# Patient Record
Sex: FEMALE | Race: WHITE | URBAN - METROPOLITAN AREA
[De-identification: names, ages, dates, MRNs, and addresses within clinical notes are randomized per-mention and may not be internally consistent; named-entity substitution may affect disease eponyms.]

---

## 2018-07-19 ENCOUNTER — OUTPATIENT (OUTPATIENT)
Dept: OUTPATIENT SERVICES | Facility: HOSPITAL | Age: 33
LOS: 1 days | Discharge: HOME | End: 2018-07-19

## 2018-07-19 DIAGNOSIS — F41.9 ANXIETY DISORDER, UNSPECIFIED: ICD-10-CM

## 2018-07-19 DIAGNOSIS — R53.83 OTHER FATIGUE: ICD-10-CM

## 2018-07-19 DIAGNOSIS — R79.9 ABNORMAL FINDING OF BLOOD CHEMISTRY, UNSPECIFIED: ICD-10-CM

## 2018-07-19 DIAGNOSIS — M79.606 PAIN IN LEG, UNSPECIFIED: ICD-10-CM

## 2019-08-28 ENCOUNTER — EMERGENCY (EMERGENCY)
Facility: HOSPITAL | Age: 34
LOS: 0 days | Discharge: HOME | End: 2019-08-29
Attending: EMERGENCY MEDICINE | Admitting: EMERGENCY MEDICINE
Payer: MEDICAID

## 2019-08-28 VITALS
WEIGHT: 179.9 LBS | RESPIRATION RATE: 18 BRPM | TEMPERATURE: 96 F | OXYGEN SATURATION: 100 % | DIASTOLIC BLOOD PRESSURE: 66 MMHG | SYSTOLIC BLOOD PRESSURE: 115 MMHG | HEART RATE: 98 BPM

## 2019-08-28 DIAGNOSIS — R20.0 ANESTHESIA OF SKIN: ICD-10-CM

## 2019-08-28 DIAGNOSIS — R51 HEADACHE: ICD-10-CM

## 2019-08-28 DIAGNOSIS — K21.9 GASTRO-ESOPHAGEAL REFLUX DISEASE WITHOUT ESOPHAGITIS: ICD-10-CM

## 2019-08-28 DIAGNOSIS — M54.5 LOW BACK PAIN: ICD-10-CM

## 2019-08-28 LAB
ALBUMIN SERPL ELPH-MCNC: 4.8 G/DL — SIGNIFICANT CHANGE UP (ref 3.5–5.2)
ALP SERPL-CCNC: 117 U/L — HIGH (ref 30–115)
ALT FLD-CCNC: 21 U/L — SIGNIFICANT CHANGE UP (ref 0–41)
ANION GAP SERPL CALC-SCNC: 16 MMOL/L — HIGH (ref 7–14)
AST SERPL-CCNC: 24 U/L — SIGNIFICANT CHANGE UP (ref 0–41)
BASOPHILS # BLD AUTO: 0.04 K/UL — SIGNIFICANT CHANGE UP (ref 0–0.2)
BASOPHILS NFR BLD AUTO: 0.4 % — SIGNIFICANT CHANGE UP (ref 0–1)
BILIRUB SERPL-MCNC: <0.2 MG/DL — SIGNIFICANT CHANGE UP (ref 0.2–1.2)
BUN SERPL-MCNC: 10 MG/DL — SIGNIFICANT CHANGE UP (ref 10–20)
CALCIUM SERPL-MCNC: 10.2 MG/DL — HIGH (ref 8.5–10.1)
CHLORIDE SERPL-SCNC: 98 MMOL/L — SIGNIFICANT CHANGE UP (ref 98–110)
CO2 SERPL-SCNC: 26 MMOL/L — SIGNIFICANT CHANGE UP (ref 17–32)
CREAT SERPL-MCNC: 0.8 MG/DL — SIGNIFICANT CHANGE UP (ref 0.7–1.5)
EOSINOPHIL # BLD AUTO: 0.13 K/UL — SIGNIFICANT CHANGE UP (ref 0–0.7)
EOSINOPHIL NFR BLD AUTO: 1.4 % — SIGNIFICANT CHANGE UP (ref 0–8)
ERYTHROCYTE [SEDIMENTATION RATE] IN BLOOD: 52 MM/HR — HIGH (ref 0–20)
GLUCOSE SERPL-MCNC: 99 MG/DL — SIGNIFICANT CHANGE UP (ref 70–99)
HCT VFR BLD CALC: 35.4 % — LOW (ref 37–47)
HGB BLD-MCNC: 10.8 G/DL — LOW (ref 12–16)
IMM GRANULOCYTES NFR BLD AUTO: 0.3 % — SIGNIFICANT CHANGE UP (ref 0.1–0.3)
LACTATE SERPL-SCNC: 1.2 MMOL/L — SIGNIFICANT CHANGE UP (ref 0.5–2.2)
LYMPHOCYTES # BLD AUTO: 2.9 K/UL — SIGNIFICANT CHANGE UP (ref 1.2–3.4)
LYMPHOCYTES # BLD AUTO: 32.2 % — SIGNIFICANT CHANGE UP (ref 20.5–51.1)
MAGNESIUM SERPL-MCNC: 2 MG/DL — SIGNIFICANT CHANGE UP (ref 1.8–2.4)
MCHC RBC-ENTMCNC: 24.9 PG — LOW (ref 27–31)
MCHC RBC-ENTMCNC: 30.5 G/DL — LOW (ref 32–37)
MCV RBC AUTO: 81.8 FL — SIGNIFICANT CHANGE UP (ref 81–99)
MONOCYTES # BLD AUTO: 0.59 K/UL — SIGNIFICANT CHANGE UP (ref 0.1–0.6)
MONOCYTES NFR BLD AUTO: 6.6 % — SIGNIFICANT CHANGE UP (ref 1.7–9.3)
NEUTROPHILS # BLD AUTO: 5.31 K/UL — SIGNIFICANT CHANGE UP (ref 1.4–6.5)
NEUTROPHILS NFR BLD AUTO: 59.1 % — SIGNIFICANT CHANGE UP (ref 42.2–75.2)
NRBC # BLD: 0 /100 WBCS — SIGNIFICANT CHANGE UP (ref 0–0)
PLATELET # BLD AUTO: 431 K/UL — HIGH (ref 130–400)
POTASSIUM SERPL-MCNC: 4.4 MMOL/L — SIGNIFICANT CHANGE UP (ref 3.5–5)
POTASSIUM SERPL-SCNC: 4.4 MMOL/L — SIGNIFICANT CHANGE UP (ref 3.5–5)
PROT SERPL-MCNC: 8.2 G/DL — HIGH (ref 6–8)
RBC # BLD: 4.33 M/UL — SIGNIFICANT CHANGE UP (ref 4.2–5.4)
RBC # FLD: 15.1 % — HIGH (ref 11.5–14.5)
SODIUM SERPL-SCNC: 140 MMOL/L — SIGNIFICANT CHANGE UP (ref 135–146)
WBC # BLD: 9 K/UL — SIGNIFICANT CHANGE UP (ref 4.8–10.8)
WBC # FLD AUTO: 9 K/UL — SIGNIFICANT CHANGE UP (ref 4.8–10.8)

## 2019-08-28 PROCEDURE — 99218: CPT

## 2019-08-28 RX ORDER — SODIUM CHLORIDE 9 MG/ML
1000 INJECTION INTRAMUSCULAR; INTRAVENOUS; SUBCUTANEOUS ONCE
Refills: 0 | Status: COMPLETED | OUTPATIENT
Start: 2019-08-28 | End: 2019-08-28

## 2019-08-28 RX ADMIN — SODIUM CHLORIDE 2000 MILLILITER(S): 9 INJECTION INTRAMUSCULAR; INTRAVENOUS; SUBCUTANEOUS at 22:00

## 2019-08-28 NOTE — ED ADULT TRIAGE NOTE - CHIEF COMPLAINT QUOTE
pt sts " I had a baby on August 5 and I got an epidural and 3 days after the delievery my entire left side feels like pins and needles and it's not going away and about 3 days ago I started having this bad migraines "

## 2019-08-28 NOTE — ED ADULT NURSE NOTE - OBJECTIVE STATEMENT
pt c.o. lower left back numbness and tingling that radiates though the entire left side up to the neck. pt states "I can feel when someone is touching that side but it feels like pins and needles throughtout the left side" pt s/p child birth 8/2019

## 2019-08-28 NOTE — ED PROVIDER NOTE - PROGRESS NOTE DETAILS
Dr. Díaz - differential most likley related to epidural based on current information, unlikely meningitis but offered patient an LP for continued investigation as her case is unclear and she declined the procedure, understands risks and benefits of doing so and has capacity. Shared medical decision making used for creation of plan. Dr. Díaz - plan d/w MRI tech, unable to get case done tonight due to existing workload, understand patient is not emergent but made notes to expedite given urgency. Will recontact to upgrade MRI if new concerning findings are identified.

## 2019-08-28 NOTE — ED PROVIDER NOTE - CLINICAL SUMMARY MEDICAL DECISION MAKING FREE TEXT BOX
33 female here for sensory deficits after recent epidural at an outside facility, described to me by patient as being problematic during the procedure. Unclear etiology of symptoms, no clear alternative explanation.  Unable to contact anyone regarding the procedure and patient has no way to get outpatient care for these new neurologic symptoms after a spinal injection. Will dispo to EDOU for urgent spinal imaging, send labs, and consult Neuro / Neurosurgery. Case d/w MARK Mccurdy as well.

## 2019-08-28 NOTE — ED PROVIDER NOTE - OBJECTIVE STATEMENT
33 female here for evaluation of multiple sensory complaints after recent epidural done 19 for her first delivery,  at [facility] but states several attempts made for the epidural which was only partially effective during her delivery. Admits to limited ambulation after the delivery due to perineal tear as described by patient. States increasing left sciatic pain and numbness progressing to left arm numbness and left headache over her temporal region. No other clear cause, denies musculoskeletal injury, overuse, trauma, social issues. No travel. No history of similar.     Unable to get any additional HPI from her delivery OB. Her PMD is not available, on vacation. Has no outpatient neuro to see.

## 2019-08-29 VITALS
DIASTOLIC BLOOD PRESSURE: 70 MMHG | TEMPERATURE: 98 F | SYSTOLIC BLOOD PRESSURE: 115 MMHG | OXYGEN SATURATION: 98 % | HEART RATE: 82 BPM | RESPIRATION RATE: 18 BRPM

## 2019-08-29 LAB — HCG UR QL: NEGATIVE — SIGNIFICANT CHANGE UP

## 2019-08-29 PROCEDURE — 72141 MRI NECK SPINE W/O DYE: CPT | Mod: 26

## 2019-08-29 PROCEDURE — 99217: CPT

## 2019-08-29 PROCEDURE — 72148 MRI LUMBAR SPINE W/O DYE: CPT | Mod: 26

## 2019-08-29 PROCEDURE — 72146 MRI CHEST SPINE W/O DYE: CPT | Mod: 26

## 2019-08-29 PROCEDURE — 99283 EMERGENCY DEPT VISIT LOW MDM: CPT

## 2019-08-29 RX ORDER — BENZOCAINE 10 %
1 GEL (GRAM) MUCOUS MEMBRANE ONCE
Refills: 0 | Status: COMPLETED | OUTPATIENT
Start: 2019-08-29 | End: 2019-08-29

## 2019-08-29 RX ORDER — ALPRAZOLAM 0.25 MG
0.5 TABLET ORAL ONCE
Refills: 0 | Status: DISCONTINUED | OUTPATIENT
Start: 2019-08-29 | End: 2019-08-29

## 2019-08-29 RX ADMIN — Medication 1 SPRAY(S): at 01:16

## 2019-08-29 RX ADMIN — Medication 0.5 MILLIGRAM(S): at 14:20

## 2019-08-29 NOTE — ED ADULT NURSE REASSESSMENT NOTE - NS ED NURSE REASSESS COMMENT FT1
Patient received from outgoing RN. Patient alert and oriented. VSS. Patient denies pain, however, complaining of discomfort to left gluteal region. Patient awaiting MRI of Cervical Spine, Lumbar Spine, and Thoracic Spine. Safety and comfort measures performed. Will continue to monitor.

## 2019-08-29 NOTE — ED CDU PROVIDER INITIAL DAY NOTE - ATTENDING CONTRIBUTION TO CARE
I personally evaluated the patient. I reviewed the Resident’s or Physician Assistant’s note (as assigned above), and agree with the findings and plan except as documented in my note.    Pt is a [age]y/o female with PMH of gerd, s/p epidural for delivery 8/5/15 at [facility], presents to ED for numbness that started in left temple, left side of neck, left arm and now she feels it in left gluteal area, left leg. + mild left sided headache. Sx's were mild, constant, improved since onset. Pt denies cp, sob, abdominal pain, fever, chills, cough, focal weakness, slurred speech. ED workup was negative, seen by neurology and neurosurgery, plan for obs for MRI's.    Constitutional: Well developed, well nourished. NAD.  Head: Normocephalic, atraumatic.  Eyes: PERRL. EOMI.  ENT: No nasal discharge. Mucous membranes moist.  Neck: Supple. Painless ROM.  Cardiovascular: Normal S1, S2. Regular rate and rhythm. No murmurs, rubs, or gallops.  Pulmonary: Normal respiratory rate and effort. Lungs clear to auscultation bilaterally. No wheezing, rales, or rhonchi.  Abdominal: Soft. Nondistended. Nontender. No rebound, guarding, rigidity.  Extremities. Pelvis stable. No lower extremity edema, symmetric calves.  Skin: No rashes, cyanosis.  Neuro: CN II-XII grossly intact, no facial asymmetry, no slurred speech. Strength 5/5 in upper and lower extremities. Sensation intact in all extremities. FNF testing normal. No pronator drift. Negative Rhombergs test. Normal gait.   Psych: Normal mood. Normal affect.

## 2019-08-29 NOTE — ED CDU PROVIDER INITIAL DAY NOTE - MEDICAL DECISION MAKING DETAILS
Pt is a [age]y/o female with PMH of gerd, s/p epidural for delivery 8/5/15 at [facility], presents to ED for numbness that started in left temple, left side of neck, left arm and now she feels it in left gluteal area, left leg. + mild left sided headache. Sx's were mild, constant, improved since onset. Pt denies cp, sob, abdominal pain, fever, chills, cough, focal weakness, slurred speech. ED workup was negative, seen by neurology and neurosurgery, plan for obs for MRI's.

## 2019-08-29 NOTE — ED ADULT NURSE REASSESSMENT NOTE - NS ED NURSE REASSESS COMMENT FT1
pt denies numbness and tingling on left side. pt verbalizes readiness for dc. no s.s acute distress.

## 2019-08-29 NOTE — ED CDU PROVIDER DISPOSITION NOTE - CARE PROVIDER_API CALL
Solo Pereyra)  Neurology  [address] [place] [address], [address]  [place] [address]  Phone: [phone]  Fax: [phone]  Follow Up Time:     Nathaniel Murdock)  EEGEpilepsy; Neurology  [address] [place] [address], [address]  [place] [address]  Phone: [phone]  Fax: [phone]  Follow Up Time:

## 2019-08-29 NOTE — ED CDU PROVIDER INITIAL DAY NOTE - OBJECTIVE STATEMENT
34y/o female with pmh of gerd, pt. states on 8/5/15 she received an epidural for the delivery of her first child and since then she has been having numbness that has been progressing. it started in left temple, left side of neck, left arm and now she feels it in left gluteal area, left leg. + mild left sided headache. denies cp, sob, abdominal pain, fever, chills, cough, focal weakness, slurred speech.

## 2019-08-29 NOTE — ED CDU PROVIDER DISPOSITION NOTE - CLINICAL COURSE
MRI results discussed with Neuro clear for dc and neurology follow up. plan discussed with DR. Fan will dispo home

## 2019-08-29 NOTE — CONSULT NOTE ADULT - SUBJECTIVE AND OBJECTIVE BOX
Neurology Consult    Patient is a 33y old  Female who presents with a chief complaint of spinal radiculopathy    HPI:Pt is a 34y/o F presenting with ~1mo history of radicular pain. Pt had  at [facility] ; pt states that she had epidural anesthesia, but several attempts were made by anesthesia at the time, and even then was not fully effective. Pt had large perineal tear and as such had pain after the procedure when she went home. She states that she did feel a shooting pain down her left leg whenever she went to bend over, but attributed it to childbirth. As of two days ago pt states she started experiencing constant pain/tingling in her left shoulder radiating down anterior arm and forearm to her 4th and 5th fingers. Yesterday, at 2AM, pt experienced neck pain that radiated to the left side of her head as a severe migraine that self resolved 4 hours later. She presents today with the constant left arm radicular pain and shooting pain from her back, down back of L leg, to toes, upon bending down.     Interval Hx: Pt denies weakness or sensory lost but instead c/o left side cervical radiculopathy that extends to the middle and pinky finger with associated left frontal temporal migrainous pain and associated left sciatic radiculopathy thats excaerbated when she flexes her hip. The pain startes from mid gluteal muscle and extends to her pinky toe.       PAST MEDICAL & SURGICAL HISTORY:  No pertinent past medical history      FAMILY HISTORY:      Social History: (-) x 3    Allergies    No Known Allergies    Intolerances        MEDICATIONS  (STANDING):    MEDICATIONS  (PRN):      Review of systems:    Constitutional: No fever, weight loss or fatigue    Eyes: No eye pain or discharge  ENMT:  No difficulty hearing; No sinus or throat pain  Neck: No pain or stiffness  Respiratory: No cough, wheezing, chills or hemoptysis  Cardiovascular: No chest pain, palpitations, shortness of breath, dyspnea on exertion  Gastrointestinal: No abdominal pain, nausea, vomiting or hematemesis; No diarrhea or constipation.   Genitourinary: No dysuria, frequency, hematuria or incontinence  Neurological: As per HPI  Skin: No rashes or lesions   Endocrine: No heat or cold intolerance; No hair loss  Musculoskeletal: No joint pain or swelling  Psychiatric: No depression, anxiety, mood swings  Heme/Lymph: No easy bruising or bleeding gums    Vital Signs Last 24 Hrs  T(C): 35.8 (28 Aug 2019 19:33), Max: 35.8 (28 Aug 2019 19:33)  T(F): 96.4 (28 Aug 2019 19:33), Max: 96.4 (28 Aug 2019 19:33)  HR: 96 (29 Aug 2019 00:45) (96 - 98)  BP: 110/68 (29 Aug 2019 00:45) (110/68 - 115/66)  BP(mean): --  RR: 20 (29 Aug 2019 00:45) (18 - 20)  SpO2: 100% (29 Aug 2019 00:45) (100% - 100%)    Neurologic Examination:  General:  Appearance is consistent with chronologic age.  No abnormal facies.   General: The patient is oriented to person, place, time and date.  Recent and remote memory intact.  Fund of knowledge is intact and normal.  Language with normal repetition, comprehension and naming.  Nondysarthric.    Cranial nerves: intact VA, VFF.  EOMI w/o nystagmus, skew or reported double vision.  PERRL.  No ptosis/weakness of eyelid closure.  Facial sensation is normal with normal bite.  No facial asymmetry.  Hearing grossly intact b/l.  Palate elevates midline.  Tongue midline.  Motor examination:   Normal tone, bulk and range of motion.  No tenderness, twitching, tremors or involuntary movements.  Formal Muscle Strength Testing: (MRC grade R/L) 5/5 UE; 5/5 LE.  No observable drift.  Reflexes:   2+ b/l pectoralis, biceps, triceps, brachioradialis, patella and Achilles.  Plantar response downgoing b/l.  Jaw jerk, Shellie, clonus absent.  Sensory examination:   Intact to light touch and pinprick, pain, temperature and proprioception and vibration in all extremities.  Cerebellum:   FTN/HKS intact with normal GRADY in all limbs.  No dysmetria or dysdiadokinesia.  Gait narrow based and normal.    Labs:   CBC Full  -  ( 28 Aug 2019 21:44 )  WBC Count : 9.00 K/uL  RBC Count : 4.33 M/uL  Hemoglobin : 10.8 g/dL  Hematocrit : 35.4 %  Platelet Count - Automated : 431 K/uL  Mean Cell Volume : 81.8 fL  Mean Cell Hemoglobin : 24.9 pg  Mean Cell Hemoglobin Concentration : 30.5 g/dL  Auto Neutrophil # : 5.31 K/uL  Auto Lymphocyte # : 2.90 K/uL  Auto Monocyte # : 0.59 K/uL  Auto Eosinophil # : 0.13 K/uL  Auto Basophil # : 0.04 K/uL  Auto Neutrophil % : 59.1 %  Auto Lymphocyte % : 32.2 %  Auto Monocyte % : 6.6 %  Auto Eosinophil % : 1.4 %  Auto Basophil % : 0.4 %        140  |  98  |  10  ----------------------------<  99  4.4   |  26  |  0.8    Ca    10.2<H>      28 Aug 2019 21:44  Mg     2.0         TPro  8.2<H>  /  Alb  4.8  /  TBili  <0.2  /  DBili  x   /  AST  24  /  ALT  21  /  AlkPhos  117<H>      LIVER FUNCTIONS - ( 28 Aug 2019 21:44 )  Alb: 4.8 g/dL / Pro: 8.2 g/dL / ALK PHOS: 117 U/L / ALT: 21 U/L / AST: 24 U/L / GGT: x                   Neuroimaging:  NCHCT:   CT Angiography/Perfusion:  MRI Brain NC:  MRA Head/Neck:  EEG:    19 @ 03:15

## 2019-08-29 NOTE — ED CDU PROVIDER DISPOSITION NOTE - ATTENDING CONTRIBUTION TO CARE
I personally evaluated the patient. I reviewed the Resident’s or Physician Assistant’s note (as assigned above), and agree with the findings and plan except as documented in my note.    Signed out to me by Dr Albert to f/u MRI results. Mild L5-S1 disc bulging though essentially unremarkable MRIs if C/T/L spine. Patient was reassessed by me, currently complaining of mild lumbar back pain. Denies numbness/tingling/weakness in lower extremities, bowel or bladder incontinence/retention, saddle anesthesia, fever, weight loss, fall/trauma; no h/o IVDA. No gross FND. Recent epidural 8/5/19 at [facility]. Ambulatory. Will discharge with neurology follow up.

## 2019-08-29 NOTE — ED ADULT NURSE REASSESSMENT NOTE - NS ED NURSE REASSESS COMMENT FT1
Patient reassessed, alert and oriented x4. Patient resting comfortably in bed. Patient BP noted to be decreased on assessment. However, patient noted to be asymptomatic. Safety and comfort measures performed. Patient awaiting MRI of Cervical, Lumbar, and Thoracic Spine. Will continue to monitor.

## 2019-08-29 NOTE — ED CDU PROVIDER INITIAL DAY NOTE - NEURO NEGATIVE STATEMENT, MLM
no loss of consciousness, no gait abnormality, no headache,+ numbness to left side of the body, no weakness.

## 2019-08-29 NOTE — CONSULT NOTE ADULT - ASSESSMENT
Assessment:  This is a 33y Female with recent vaginal delivery s/p epidural yolanda present with spinal like radicuopathy    Plan:  Full Spinal Survey MRI   Frequent Neuro checks Assessment:  This is a 33y Female with recent vaginal delivery s/p epidural yolanda present with spinal like radicuopathy    Plan:  Full Spinal Survey MRI   Frequent Neuro checks   Will hold off on decadron until after MRI  Further Recommendations to follow Assessment:  This is a 33y Female with recent vaginal delivery s/p epidural now presents with left sided sciatica and 4th/5th digit intermittent numbness.  May have lumbar radiculopathy but LUE extremity symptoms are probably myofascial in nature.    Plan:    1.  Lumbar spine MRI to exclude L5, S1 nerve root impingement.  2.  Outpatient PT.  3.  Outpatient EMG/NCS left arm and leg if symptoms persist.

## 2019-08-29 NOTE — ED CDU PROVIDER DISPOSITION NOTE - PATIENT PORTAL LINK FT
You can access the FollowMyHealth Patient Portal offered by [facility] by registering at the following website: [email or web address]. By joining [employer]’s FollowMyHealth portal, you will also be able to view your health information using other applications (apps) compatible with our system.

## 2019-08-29 NOTE — ED CDU PROVIDER INITIAL DAY NOTE - PROGRESS NOTE DETAILS
pt seen bedside, NAD, no complaints. pt still feeling some numbness in left hand. Going for MRI spine. will continue to monitor patient.

## 2019-08-29 NOTE — ED CDU PROVIDER DISPOSITION NOTE - CARE PROVIDERS DIRECT ADDRESSES
[email or web address]

## 2019-08-29 NOTE — CONSULT NOTE ADULT - ATTENDING COMMENTS
As above.  At this point there is no imaging available to determine if there is a neurosurgical problem that needs to be addressed.

## 2019-08-29 NOTE — CONSULT NOTE ADULT - SUBJECTIVE AND OBJECTIVE BOX
LESLIE MONTALVO  [id number]      Current Issues: Pt is a 34y/o F presenting with ~1mo history of radicular pain. Pt had  at [facility] ; pt states that she had epidural anesthesia, but several attempts were made by anesthesia at the time, and even then was not fully effective. Pt had large perineal tear and as such had pain after the procedure when she went home. She states that she did feel a shooting pain down her left leg whenever she went to bend over, but attributed it to childbirth. As of two days ago pt states she started experiencing constant pain/tingling in her left shoulder radiating down anterior arm and forearm to her 4th and 5th fingers. Yesterday, at 2AM, pt experienced neck pain that radiated to the left side of her head as a severe migraine that self resolved 4 hours later. She presents today with the constant left arm radicular pain and shooting pain from her back, down back of L leg, to toes, upon bending down.     PAST MEDICAL & SURGICAL HISTORY:  No pertinent past medical history    Allergies  No Known Allergies    Home Medications:    OTHER:  benzocaine 20%/menthol 0.5% Spray 1 Spray(s) Topical once    Vital Signs Last 24 Hrs  T(C): 35.8 (28 Aug 2019 19:33), Max: 35.8 (28 Aug 2019 19:33)  T(F): 96.4 (28 Aug 2019 19:33), Max: 96.4 (28 Aug 2019 19:33)  HR: 98 (28 Aug 2019 19:33) (98 - 98)  BP: 115/66 (28 Aug 2019 19:33) (115/66 - 115/66)  RR: 18 (28 Aug 2019 19:33) (18 - 18)  SpO2: 100% (28 Aug 2019 19:33) (100% - 100%)    PHYSICAL EXAM:  AAOX3. Verbal function intact  Follows commands  tongue midline, facial motions symmetric  PERRL, EOMI  Pronator Drift: none  Finger to Nose intact  Motor: MAEx4, 5/5 power in b/l UE and LE  Sensation: intact to touch in all extremities        LABS:                        10.8   9.00  )-----------( 431      ( 28 Aug 2019 21:44 )             35.4     08-    140  |  98  |  10  ----------------------------<  99  4.4   |  26  |  0.8    Ca    10.2<H>      28 Aug 2019 21:44  Mg     2.0         TPro  8.2<H>  /  Alb  4.8  /  TBili  <0.2  /  DBili  x   /  AST  24  /  ALT  21  /  AlkPhos  117<H>        RADIOLOGY & ADDITIONAL STUDIES:  PENDING      Plan:  - Case discussed with Dr. Honeycutt  - F/u MRI results   - Further recommendations once imaging is done.            .

## 2019-08-30 NOTE — CHART NOTE - NSCHARTNOTEFT_GEN_A_CORE
MRI's reviewed.  No curly disc herniations in cervical, thoracic or lumbar spine.  No neurosurgical intervention at this time.  PT, NSAID, muscle relaxants for now.

## 2020-02-11 PROBLEM — Z78.9 OTHER SPECIFIED HEALTH STATUS: Chronic | Status: ACTIVE | Noted: 2019-08-28

## 2020-04-03 PROBLEM — Z00.00 ENCOUNTER FOR PREVENTIVE HEALTH EXAMINATION: Status: ACTIVE | Noted: 2020-04-03

## 2020-04-06 ENCOUNTER — APPOINTMENT (OUTPATIENT)
Dept: UROGYNECOLOGY | Facility: CLINIC | Age: 35
End: 2020-04-06
Payer: MEDICAID

## 2020-04-06 DIAGNOSIS — Z87.891 PERSONAL HISTORY OF NICOTINE DEPENDENCE: ICD-10-CM

## 2020-04-06 DIAGNOSIS — Z78.9 OTHER SPECIFIED HEALTH STATUS: ICD-10-CM

## 2020-04-06 DIAGNOSIS — Z83.49 FAMILY HISTORY OF OTHER ENDOCRINE, NUTRITIONAL AND METABOLIC DISEASES: ICD-10-CM

## 2020-04-06 DIAGNOSIS — K59.00 CONSTIPATION, UNSPECIFIED: ICD-10-CM

## 2020-04-06 DIAGNOSIS — Z80.41 FAMILY HISTORY OF MALIGNANT NEOPLASM OF OVARY: ICD-10-CM

## 2020-04-06 DIAGNOSIS — Z86.19 PERSONAL HISTORY OF OTHER INFECTIOUS AND PARASITIC DISEASES: ICD-10-CM

## 2020-04-06 PROCEDURE — 99203 OFFICE O/P NEW LOW 30 MIN: CPT | Mod: 95

## 2020-04-06 NOTE — HISTORY OF PRESENT ILLNESS
[Home] : at home, [unfilled] , at the time of the visit. [Other Location: e.g. Home (Enter Location, City,State)___] : at [unfilled] [Patient] : the patient [Self] : self [FreeTextEntry2] : Joie Martins [FreeTextEntry1] : \par Pt with pelvic floor dysfunction here for urogynecologic evaluation. She describes: \par Referring provider: Dr Nichols\par \par Chief PFD: urinary incontinence\par \par Pelvic organ prolapse: no bulge, denies splinting\par Stress urinary incontinence: with coughing, bending, laughing, worse since delivery, most bothered by the leakage with bending and leakage without warning\par Overactive bladder syndrome: voids q15 minutes secondary to incontinence, 2 voids per night, no eneuresis, urge incontinence daily, leakage without warning daily, since vaginal delivery 8 months ago, tried kegels, no glaucoma, not breastfeeding\par Voiding dysfunction: no Incomplete bladder emptying, no hesitancy \par Lower urinary tract/vaginal symptoms: no recurrent UTIs per year, no hematuria, no dysuria, no bladder pain \par Fecal incontinence: denies\par Defecatory dysfunction: sausage\par Sexual dysfunction: no pain, urinary incontinence with orgasm and penetration  \par Pelvic pain: denies\par Vaginal dryness: denies\par \par Her pelvic floor symptoms are significantly bothersome and negatively impacting her quality of life. \par \par

## 2020-04-06 NOTE — DISCUSSION/SUMMARY
[FreeTextEntry1] : \par Mixed incontinence-\par The pathophysiology of the above condition was discussed with the patient. The patient was given information and education on pelvic floor muscle exercises/rehabilitation, avoidance of dietary bladder irritants, and other strategies to improve bladder control, such as scheduled voiding. She was counseled regarding further management strategies for overactive bladder and urge incontinence including pelvic floor physical therapy, medications or surgical management. The patient voiced understanding and agrees with diet changes, constipation control, referral to PT and medical management. The risks and benefits of trospium was reviewed. \par \par The management options for stress incontinence were discussed including observation, pessary placement, pelvic floor physical therapy or surgery. The patient voiced understanding and agrees with a referral to PT and trying the impressa (over the counter) for now.\par \par Constipation-\par Advised starting miralax daily for constipation control.\par \par \par

## 2020-04-06 NOTE — COUNSELING
[FreeTextEntry1] : \par Please increase your water intake to at least 5 cups of water per day\par \par For 4-5 days, cut one item from the list out of your diet.\par \par After 4-5 days, it it makes a difference, you have to decide if it is worth keeping it out of your diet to help with the urinary issues.\par \par If it does not make a difference, you should add it back into your diet and remove another item for another 4-5 days.\par \par Please start taking Miralax (over the counter) for constipation control\par \par Please start taking the trospium (bladder medication) twice a day for bladder control\par \par Please call my office if you have any issues with the cost or side effects of the medication.\par \par Please try the impressa (over the counter) to help with the leakage with bending.\par \par Referral to pelvic floor PT\par \par [facility]\par [place], [place]\par \par \par [facility]\par [address] [place] [id number]\par \par \par Schedule 6 week med check [facility] [place]

## 2020-05-19 RX ORDER — TROSPIUM CHLORIDE 20 MG/1
20 TABLET, FILM COATED ORAL
Qty: 60 | Refills: 1 | Status: DISCONTINUED | COMMUNITY
Start: 2020-04-06 | End: 2020-05-19

## 2020-05-21 ENCOUNTER — APPOINTMENT (OUTPATIENT)
Dept: UROGYNECOLOGY | Facility: CLINIC | Age: 35
End: 2020-05-21

## 2020-07-02 ENCOUNTER — APPOINTMENT (OUTPATIENT)
Dept: UROGYNECOLOGY | Facility: CLINIC | Age: 35
End: 2020-07-02

## 2020-11-28 ENCOUNTER — EMERGENCY (EMERGENCY)
Facility: HOSPITAL | Age: 35
LOS: 0 days | Discharge: HOME | End: 2020-11-28
Attending: EMERGENCY MEDICINE | Admitting: EMERGENCY MEDICINE
Payer: MEDICAID

## 2020-11-28 VITALS
RESPIRATION RATE: 18 BRPM | SYSTOLIC BLOOD PRESSURE: 110 MMHG | DIASTOLIC BLOOD PRESSURE: 74 MMHG | TEMPERATURE: 98 F | HEART RATE: 78 BPM | OXYGEN SATURATION: 100 %

## 2020-11-28 VITALS — WEIGHT: 169.98 LBS

## 2020-11-28 DIAGNOSIS — R07.89 OTHER CHEST PAIN: ICD-10-CM

## 2020-11-28 DIAGNOSIS — M79.602 PAIN IN LEFT ARM: ICD-10-CM

## 2020-11-28 LAB
ALBUMIN SERPL ELPH-MCNC: 4.4 G/DL — SIGNIFICANT CHANGE UP (ref 3.5–5.2)
ALP SERPL-CCNC: 100 U/L — SIGNIFICANT CHANGE UP (ref 30–115)
ALT FLD-CCNC: 23 U/L — SIGNIFICANT CHANGE UP (ref 0–41)
ANION GAP SERPL CALC-SCNC: 9 MMOL/L — SIGNIFICANT CHANGE UP (ref 7–14)
AST SERPL-CCNC: 20 U/L — SIGNIFICANT CHANGE UP (ref 0–41)
BASOPHILS # BLD AUTO: 0.05 K/UL — SIGNIFICANT CHANGE UP (ref 0–0.2)
BASOPHILS NFR BLD AUTO: 0.6 % — SIGNIFICANT CHANGE UP (ref 0–1)
BILIRUB SERPL-MCNC: <0.2 MG/DL — SIGNIFICANT CHANGE UP (ref 0.2–1.2)
BUN SERPL-MCNC: 13 MG/DL — SIGNIFICANT CHANGE UP (ref 10–20)
CALCIUM SERPL-MCNC: 9.3 MG/DL — SIGNIFICANT CHANGE UP (ref 8.5–10.1)
CHLORIDE SERPL-SCNC: 105 MMOL/L — SIGNIFICANT CHANGE UP (ref 98–110)
CO2 SERPL-SCNC: 25 MMOL/L — SIGNIFICANT CHANGE UP (ref 17–32)
CREAT SERPL-MCNC: 0.7 MG/DL — SIGNIFICANT CHANGE UP (ref 0.7–1.5)
EOSINOPHIL # BLD AUTO: 0.08 K/UL — SIGNIFICANT CHANGE UP (ref 0–0.7)
EOSINOPHIL NFR BLD AUTO: 0.9 % — SIGNIFICANT CHANGE UP (ref 0–8)
GLUCOSE SERPL-MCNC: 93 MG/DL — SIGNIFICANT CHANGE UP (ref 70–99)
HCT VFR BLD CALC: 35.8 % — LOW (ref 37–47)
HGB BLD-MCNC: 11.5 G/DL — LOW (ref 12–16)
IMM GRANULOCYTES NFR BLD AUTO: 0.2 % — SIGNIFICANT CHANGE UP (ref 0.1–0.3)
LYMPHOCYTES # BLD AUTO: 1.76 K/UL — SIGNIFICANT CHANGE UP (ref 1.2–3.4)
LYMPHOCYTES # BLD AUTO: 20.1 % — LOW (ref 20.5–51.1)
MCHC RBC-ENTMCNC: 27.4 PG — SIGNIFICANT CHANGE UP (ref 27–31)
MCHC RBC-ENTMCNC: 32.1 G/DL — SIGNIFICANT CHANGE UP (ref 32–37)
MCV RBC AUTO: 85.2 FL — SIGNIFICANT CHANGE UP (ref 81–99)
MONOCYTES # BLD AUTO: 0.5 K/UL — SIGNIFICANT CHANGE UP (ref 0.1–0.6)
MONOCYTES NFR BLD AUTO: 5.7 % — SIGNIFICANT CHANGE UP (ref 1.7–9.3)
NEUTROPHILS # BLD AUTO: 6.34 K/UL — SIGNIFICANT CHANGE UP (ref 1.4–6.5)
NEUTROPHILS NFR BLD AUTO: 72.5 % — SIGNIFICANT CHANGE UP (ref 42.2–75.2)
NRBC # BLD: 0 /100 WBCS — SIGNIFICANT CHANGE UP (ref 0–0)
PLATELET # BLD AUTO: 275 K/UL — SIGNIFICANT CHANGE UP (ref 130–400)
POTASSIUM SERPL-MCNC: 4.5 MMOL/L — SIGNIFICANT CHANGE UP (ref 3.5–5)
POTASSIUM SERPL-SCNC: 4.5 MMOL/L — SIGNIFICANT CHANGE UP (ref 3.5–5)
PROT SERPL-MCNC: 7 G/DL — SIGNIFICANT CHANGE UP (ref 6–8)
RBC # BLD: 4.2 M/UL — SIGNIFICANT CHANGE UP (ref 4.2–5.4)
RBC # FLD: 14.7 % — HIGH (ref 11.5–14.5)
SODIUM SERPL-SCNC: 139 MMOL/L — SIGNIFICANT CHANGE UP (ref 135–146)
TROPONIN T SERPL-MCNC: <0.01 NG/ML — SIGNIFICANT CHANGE UP
WBC # BLD: 8.75 K/UL — SIGNIFICANT CHANGE UP (ref 4.8–10.8)
WBC # FLD AUTO: 8.75 K/UL — SIGNIFICANT CHANGE UP (ref 4.8–10.8)

## 2020-11-28 PROCEDURE — 71046 X-RAY EXAM CHEST 2 VIEWS: CPT | Mod: 26

## 2020-11-28 PROCEDURE — 99285 EMERGENCY DEPT VISIT HI MDM: CPT

## 2020-11-28 NOTE — ED PROVIDER NOTE - PHYSICAL EXAMINATION
Vital Signs: I have reviewed the initial vital signs.  Constitutional: well-nourished, no acute distress  Eyes: PERRLA, EOMI,  clear conjunctiva  Cardiovascular: regular rate, regular rhythm, no murmur appreciated  Respiratory: unlabored respiratory effort, clear to auscultation bilaterally, no chest wall tenderness  Gastrointestinal: soft, non-tender, non-distended  abdomen,  Musculoskeletal: supple neck, no cervical tenderness, no tenderness to trapezius, no Ac tenderness to left shoulder, no lower extremity edema or calf tenderness, no bony tenderness  Integumentary: warm, dry, no rash  Neurologic: awake, alert, cranial nerves II-XII grossly intact, extremities’ motor and sensory functions grossly intact, no focal deficits

## 2020-11-28 NOTE — ED PROVIDER NOTE - OBJECTIVE STATEMENT
36 y/o female non smoker presents to the ED with left arm pain shooting to left 4,5th digits. patient denies any sob. patient states pain radiates to left chest. not worse with movement or deep inspiration. patient states symptoms started at 2000 last night. patient denies any fever,chills. no abdominal pain. no hx of heavy lifting or falls. no palpitations, diaphoresis. no rashes

## 2020-11-28 NOTE — ED ADULT NURSE NOTE - CINV DISCH TEACH PARTICIP
How Severe Are Your Spot(S)?: moderate
Have Your Spot(S) Been Treated In The Past?: has not been treated
Hpi Title: Evaluation of Skin Lesions
Patient

## 2020-11-28 NOTE — ED PROVIDER NOTE - CLINICAL SUMMARY MEDICAL DECISION MAKING FREE TEXT BOX
Patient presents with chest pain. labs, ekg, cxr done. no acute findings. Heart score of zero. Discharged with cardiology follow up and return precautions discussed.

## 2020-11-28 NOTE — ED PROVIDER NOTE - ATTENDING CONTRIBUTION TO CARE
35 y F no pmh presents with left arm pain. States that last night she started to have some left arm and left sided chest pain, sharp, non radiating, constant, 5/10. no trouble breathing, no shortness of breath, no palpitations. no similar episodes in the past. no leg swelling or pain. pain not associated with movement. Also reports one episode of epistaxis at home that resolved on its own    CONSTITUTIONAL: Well-developed; well-nourished; in no acute distress.   SKIN: warm, dry  HEAD: Normocephalic; atraumatic.  EYES: PERRL, EOMI, no conjunctival erythema  ENT: No nasal discharge; airway clear.  NECK: Supple; non tender.  CARD: S1, S2 normal;  Regular rate and rhythm.   RESP: No wheezes, rales or rhonchi.  ABD: soft non tender, non distended, no rebound or guarding  EXT: Normal ROM.  5/5 strength in all 4 extremities. no pedal edema, no calf tenderness.   LYMPH: No acute cervical adenopathy.  NEURO: Alert, oriented, grossly unremarkable. neurovascularly intact  PSYCH: Cooperative, appropriate.

## 2020-11-28 NOTE — ED ADULT NURSE NOTE - NSSEPSISSUSPECTED_ED_A_ED
"Chief Complaint   Patient presents with     Knee Pain     C/o knealing wrong on right knee yesterday. Today is swollen, painful and hard to walk or bend.       Initial /79  Pulse 80  Temp 97.2  F (36.2  C) (Oral)  Wt 132 lb (59.9 kg)  SpO2 95%  BMI 23.76 kg/m2 Estimated body mass index is 23.76 kg/(m^2) as calculated from the following:    Height as of 7/2/18: 5' 2.5\" (1.588 m).    Weight as of this encounter: 132 lb (59.9 kg)..  BP completed using cuff size: mary Diez CMA    "
No

## 2020-11-28 NOTE — ED PROVIDER NOTE - NS ED ROS FT
Review of Systems    Constitutional: (-) fever/ chills (-)loss of appetite or  weight loss  ENT: (-) epistaxis (-) sore throat (-) ear pain  Cardiovascular: (-) left sided chest pain, (-) syncope (-) palpitations  Respiratory: (-) cough, (-) shortness of breath  Gastrointestinal: (-) vomiting, (-) diarrhea (-) abdominal pain  neck: (-) neck pain or stiffness  Musculoskeletal:  (-) back pain, (-) joint pain   Integumentary: (-) rash, (-) swelling  Neurological: (-) headache, (-) altered mental status

## 2020-11-28 NOTE — ED PROVIDER NOTE - PATIENT PORTAL LINK FT
You can access the FollowMyHealth Patient Portal offered by [facility] by registering at the following website: [email or web address]. By joining [facility]’s FollowMyHealth portal, you will also be able to view your health information using other applications (apps) compatible with our system.

## 2020-11-28 NOTE — ED PROVIDER NOTE - CARE PROVIDER_API CALL
JAIME THOMAS  Cardiovascular Disease  [address] [place] [address]  Phone: [phone]  Fax: [phone]  Follow Up Time:

## 2020-11-28 NOTE — ED ADULT TRIAGE NOTE - CHIEF COMPLAINT QUOTE
"I'm having some tingling that starts in my left hand and travels up my arm and into my left chest." Symptoms began last night.

## 2020-12-29 NOTE — ED PROVIDER NOTE - MDM PATIENT STATEMENT FOR ADDL TREATMENT
PATIENT INFORMATION    Check insurance coverage for Shingrix    -- Fasting labwork has been ordered for you.  General patient information when having a lab test:  · Fasting - No caloric intake (WATER IS OKAY) for a minimum of 8-10 hours before your blood draw:  · Tests that commonly require fasting are:  · Cholesterol (lipid panel)  · Basic chemistry panel  · Comprehensive chemistry panel  · Glucose (blood sugar)   · Medicine - You can take any prescribed or routine medicine during your fast.  · Brushing Teeth - You can brush your teeth even if you are fasting.  · Getting Your Results - Your doctor’s office will notify you of your results within 10 working days.    -- Call for mammogram      Follow-Up  -- Make an appointment with Mikaela Samaniego MD in  6-12 months    -- You have been refered to :Gastroenterology (GI) at the [facility] or the [facility] ([facility]) - Phone # is [phone]. Please call if you have not heard from that department in 3 days.     Additional Educational Resources:  For additional resources regarding your symptoms, diagnosis, or further health information, please visit the Health Resources section on [email or web address] or the Online Health Resources section in [email or web address].      
Patient with one or more new problems requiring additional work-up/treatment.

## 2021-03-08 ENCOUNTER — APPOINTMENT (OUTPATIENT)
Dept: UROGYNECOLOGY | Facility: CLINIC | Age: 36
End: 2021-03-08
Payer: MEDICAID

## 2021-03-08 VITALS
BODY MASS INDEX: 27.84 KG/M2 | WEIGHT: 173.25 LBS | HEIGHT: 66 IN | DIASTOLIC BLOOD PRESSURE: 72 MMHG | SYSTOLIC BLOOD PRESSURE: 111 MMHG | HEART RATE: 79 BPM

## 2021-03-08 PROCEDURE — 51701 INSERT BLADDER CATHETER: CPT

## 2021-03-08 PROCEDURE — 99072 ADDL SUPL MATRL&STAF TM PHE: CPT

## 2021-03-08 PROCEDURE — 99213 OFFICE O/P EST LOW 20 MIN: CPT | Mod: 25

## 2021-03-08 RX ORDER — OXYBUTYNIN CHLORIDE 5 MG/1
5 TABLET ORAL
Qty: 60 | Refills: 1 | Status: DISCONTINUED | COMMUNITY
Start: 2020-05-19 | End: 2021-03-08

## 2021-03-08 NOTE — COUNSELING
[FreeTextEntry1] : If you feel like you have an infection it is important for you to call our office and we will arrange testing of your urine.\par \par We will contact you if the urine results are abnormal.\par \par Please STOP taking Oxybutynin 5 mg.\par \par Please begin taking Vesicare 5 mg. It takes up to 6 weeks to go into full effect. Please  your refill when you complete the 1st bottle.\par \par Please call my office if you have any issues with the cost or side effects of the medication. \par \par Schedule a 6 weeks follow up med check appointment.\par

## 2021-03-08 NOTE — PHYSICAL EXAM
[Chaperone Present] : A chaperone was present in the examining room during all aspects of the physical examination [No Acute Distress] : in no acute distress [Well developed] : well developed [Well Nourished] : ~L well nourished [FreeTextEntry1] : Urethra was prepped in sterile fashion and then a sterile catheter was used by me to drain the bladder.\par void: 325cc\par PVR: 15cc

## 2021-03-08 NOTE — DISCUSSION/SUMMARY
[FreeTextEntry1] : Mixed Incontinence-\par Vesicare 5mg QD\par Precautions reviewed.\par Will return in 6 weeks for follow up or earlier if she has any issues.\par \par

## 2021-03-08 NOTE — HISTORY OF PRESENT ILLNESS
[FreeTextEntry1] : Patient is here for 12 months med check for mixed incontinence.\par Last seen on 4/6/2020 as a new pt via telehealth for urinary incontinence.  \par \par Trospium 20mg: no benefit\par Oxybutynin 5mg BID\par \par Sexually active\par no glaucoma\par \par Today, patient states that Oxybutynin 5mg BID is not helping her at all. She's leaking all the time. With coughing, sneezing, laughing, when picking up her child, when bending down. She's leaking throughout the day, c/o frequency and urgency of urination. Denies side effects. Patient does not feel she has an infection.\par \par Patient would like to try another bladder medication. \par

## 2021-03-09 LAB
APPEARANCE: CLEAR
BILIRUBIN URINE: NEGATIVE
BLOOD URINE: NEGATIVE
COLOR: NORMAL
GLUCOSE QUALITATIVE U: NEGATIVE
KETONES URINE: NEGATIVE
LEUKOCYTE ESTERASE URINE: NEGATIVE
NITRITE URINE: NEGATIVE
PH URINE: 7
PROTEIN URINE: NORMAL
SPECIFIC GRAVITY URINE: 1.01
UROBILINOGEN URINE: NORMAL

## 2021-03-10 ENCOUNTER — NON-APPOINTMENT (OUTPATIENT)
Age: 36
End: 2021-03-10

## 2021-03-10 LAB — BACTERIA UR CULT: NORMAL

## 2021-03-10 RX ORDER — SOLIFENACIN SUCCINATE 5 MG/1
5 TABLET ORAL
Qty: 30 | Refills: 1 | Status: DISCONTINUED | COMMUNITY
Start: 2021-03-08 | End: 2021-03-10

## 2021-04-19 ENCOUNTER — APPOINTMENT (OUTPATIENT)
Dept: UROGYNECOLOGY | Facility: CLINIC | Age: 36
End: 2021-04-19

## 2021-04-20 ENCOUNTER — NON-APPOINTMENT (OUTPATIENT)
Age: 36
End: 2021-04-20

## 2022-05-13 NOTE — ED CDU PROVIDER DISPOSITION NOTE - NS ED MD DISPO DISCHARGE
Britany Kwon is a 72 y.o. male who was seen by synchronous (real-time) audio-video technology on 5/13/2022 for No chief complaint on file. Assessment & Plan:   {A/P PLUS DISPO EOJN:15344}    {time statement optional (Optional):61474}    Subjective:       Prior to Admission medications    Medication Sig Start Date End Date Taking? Authorizing Provider   glucose blood VI test strips (OneTouch Ultra Test) strip TEST BLOOD SUGARS THREE TIMES DAILY DX E11.42 4/25/22   Eron Mayers MD   Insulin Needles, Disposable, (BD Ultra-Fine Short Pen Needle) 31 gauge x 5/16\" ndle USE TO GIVE INSULIN UNDER THE SKIN THREE TIMES DAILY. E11.9 4/11/22   Rosa Maria Friend, EDA   tiZANidine (ZANAFLEX) 2 mg tablet TAKE 1 TABLET BY MOUTH THREE TIMES A DAY AS NEEDED FOR MUSCLE SPASMS 4/7/22   Eron Mayers MD   insulin glargine U-300 conc (Toujeo SoloStar U-300 Insulin) 300 unit/mL (1.5 mL) inpn pen 55 Units by SubCUTAneous route daily. Indications: type 2 diabetes mellitus 3/24/22   Eron Mayers MD   ARIPiprazole (ABILIFY) 2 mg tablet Take 1 Tablet by mouth nightly. 3/17/22   rEon Mayers MD   vortioxetine (Trintellix) 10 mg tablet Take 1 Tablet by mouth daily. 3/17/22   Eron Mayers MD   gabapentin (NEURONTIN) 300 mg capsule TAKE 1 CAPSULE BY MOUTH THREE TIMES A DAY 3/15/22   Eron Mayers MD   insulin lispro (HumaLOG KwikPen Insulin) 100 unit/mL kwikpen 25 Units by SubCUTAneous route Before breakfast, lunch, and dinner. 3/14/22   Eron Mayers MD   clopidogreL (PLAVIX) 75 mg tab Take 75 mg by mouth daily. Provider, Historical   esomeprazole (NexIUM) 40 mg capsule Take 1 Capsule by mouth two (2) times a day. 3/2/22   Ruby Elias MD   flash glucose sensor (FreeStyle Keenan 14 Day Sensor) kit Apply and replace sensor every 14 days. Use to scan at least 3 times daily.  Dx: E11.42, Z79.4 2/17/22   Eron Mayers MD   tamsulosin (FLOMAX) 0.4 mg capsule TAKE 1 CAPSULE BY MOUTH EVERY DAY 1/6/22   Eron Mayers MD acetaminophen (Tylenol Arthritis Pain) 650 mg TbER Take 1 Tablet by mouth every eight (8) hours as needed (back pain). 12/8/21   Froylan Schaffer MD   midodrine (PROAMATINE) 5 mg tablet Take 5 mg by mouth three (3) times daily (with meals). 7/17/21   Eliseo Olson MD   metoprolol succinate (TOPROL-XL) 25 mg XL tablet Take 25 mg by mouth every evening. Provider, Historical   Trelegy Ellipta 100-62.5-25 mcg inhaler TAKE 1 PUFF BY MOUTH EVERY DAY 5/7/21   Provider, Historical   metFORMIN (GLUCOPHAGE) 1,000 mg tablet TAKE 1 TABLET BY MOUTH TWICE A DAY WITH MEALS 6/8/21   Froylan Schaffer MD   atorvastatin (LIPITOR) 80 mg tablet TAKE 1 TABLET BY MOUTH EVERY DAY 3/15/21   Froylan Schaffer MD   aspirin delayed-release 81 mg tablet take 1 tablet by oral route  every day 2/18/20   Provider, Historical   albuterol (PROVENTIL HFA, VENTOLIN HFA, PROAIR HFA) 90 mcg/actuation inhaler Take 2 Puffs by inhalation every six (6) hours as needed for Wheezing. 8/19/20   Froylan Schaffer MD   nitroglycerin (NITROSTAT) 0.4 mg SL tablet DISSOLVE ONE TABLET UNDER TONGUE EVERY FIVE MINUTES AS NEEDED FOR CHEST PAIN. May repeat for 3 doses. Call 911 if Chest pain not relieved.  10/4/17   Rupert Muñoz MD     {History SmartLink choices - disappears if left unselected (Optional):77449}    ROS    Objective:     Patient-Reported Vitals 5/11/2021   Patient-Reported Weight 176lb        [INSTRUCTIONS:  \"[x]\" Indicates a positive item  \"[]\" Indicates a negative item  -- DELETE ALL ITEMS NOT EXAMINED]    Constitutional: [x] Appears well-developed and well-nourished [x] No apparent distress      [] Abnormal -     Mental status: [x] Alert and awake  [x] Oriented to person/place/time [x] Able to follow commands    [] Abnormal -     Eyes:   EOM    [x]  Normal    [] Abnormal -   Sclera  [x]  Normal    [] Abnormal -          Discharge [x]  None visible   [] Abnormal -     HENT: [x] Normocephalic, atraumatic  [] Abnormal -   [x] Mouth/Throat: Mucous membranes are moist    External Ears [x] Normal  [] Abnormal -    Neck: [x] No visualized mass [] Abnormal -     Pulmonary/Chest: [x] Respiratory effort normal   [x] No visualized signs of difficulty breathing or respiratory distress        [] Abnormal -      Musculoskeletal:   [x] Normal gait with no signs of ataxia         [x] Normal range of motion of neck        [] Abnormal -     Neurological:        [x] No Facial Asymmetry (Cranial nerve 7 motor function) (limited exam due to video visit)          [x] No gaze palsy        [] Abnormal -          Skin:        [x] No significant exanthematous lesions or discoloration noted on facial skin         [] Abnormal -            Psychiatric:       [x] Normal Affect [] Abnormal -        [x] No Hallucinations    Other pertinent observable physical exam findings:-        We discussed the expected course, resolution and complications of the diagnosis(es) in detail. Medication risks, benefits, costs, interactions, and alternatives were discussed as indicated. I advised him to contact the office if his condition worsens, changes or fails to improve as anticipated. He expressed understanding with the diagnosis(es) and plan. Jania Marquez, was evaluated through a synchronous (real-time) audio-video encounter. The patient (or guardian if applicable) is aware that this is a billable service, which includes applicable co-pays. Verbal consent to proceed has been obtained. The visit was conducted pursuant to the emergency declaration under the [address] [place] [address], [address] [place] [address] authority and the [employer] Act. Patient identification was verified, and a caregiver was present when appropriate. The patient was located at home in a state where the provider was licensed to provide care.       Sydney Linn NP Home

## 2022-05-19 ENCOUNTER — APPOINTMENT (OUTPATIENT)
Dept: UROGYNECOLOGY | Facility: CLINIC | Age: 37
End: 2022-05-19
Payer: MEDICAID

## 2022-05-19 VITALS
SYSTOLIC BLOOD PRESSURE: 109 MMHG | DIASTOLIC BLOOD PRESSURE: 73 MMHG | WEIGHT: 175 LBS | HEART RATE: 85 BPM | BODY MASS INDEX: 28.12 KG/M2 | HEIGHT: 66 IN

## 2022-05-19 PROCEDURE — 99214 OFFICE O/P EST MOD 30 MIN: CPT

## 2022-05-19 RX ORDER — DARIFENACIN HYDROBROMIDE 7.5 MG/1
7.5 TABLET, EXTENDED RELEASE ORAL
Qty: 30 | Refills: 1 | Status: DISCONTINUED | COMMUNITY
Start: 2021-03-10 | End: 2022-05-19

## 2022-05-19 NOTE — DISCUSSION/SUMMARY
[FreeTextEntry1] : \par Mixed Incontinence-\par Counseled the patient on the treatment options for OAB including trying another medication (Myrbetriq) or trying third line options. Counseled the patient on third line therapies including intravesicular Botox, PTNS and Interstim.  \par \par She is possibly considering starting Myrbetriq but does not want to have to take pills every day long term.\par \par Counseled the patient on treatment options for stress incontinence including observation, pelvic floor physical therapy, pessary, or surgery. Patient is interested in surgical option. \par \par Patient will be scheduled for UDS followed by cysto and surgical counseling. Handouts regarding UDS, cysto, third line therapies and management options for JIMENA were given to the patient. \par \par

## 2022-05-19 NOTE — COUNSELING
[FreeTextEntry1] : \par If you feel like you have an infection it is important for you to call our office and we will arrange testing of your urine.\par \par Schedule bladder function testing (UDS without reduction) with MARK Pryor \par \par Please call the office if you feel like you have an infection because we cannot do the bladder function testing in the setting of an infection.\par \par Please come with a full, not painful bladder.\par \par Please schedule cysto and surgical counseling with Dr. Guerrero (to be scheduled for a date after UDS, 1 hour)\par \par Call with any questions\par

## 2022-05-19 NOTE — HISTORY OF PRESENT ILLNESS
[FreeTextEntry1] : Patient is here for 14 months med check for mixed incontinence.\par Last seen on 3/8/2021 for med check.\par \par Trospium 20mg: no benefit\par Oxybutynin 5mg BID: no benefit\par Darefenacin 7.5 QD\par \par Sexually active\par no glaucoma\par \par Today, patient states she saw no improvement of her symptoms after taking Darifenacin 7.5 mg once a day for 3 months so she stopped taking it. Denies side effects. Patient does not feel she has an infection.\par \par Patient is complaining that she is leaking all the time throughout the day. She leaks without warning and also running and moving and with coughing, sneezing, laughing. She doesn’t want to have to keep taking medications. \par \par She went to pelvic floor physical therapy, states that did not help her at all. Has tried Impressa, did not feel that helped her. \par

## 2022-06-27 ENCOUNTER — APPOINTMENT (OUTPATIENT)
Dept: UROGYNECOLOGY | Facility: CLINIC | Age: 37
End: 2022-06-27
Payer: MEDICAID

## 2022-06-27 ENCOUNTER — RESULT CHARGE (OUTPATIENT)
Age: 37
End: 2022-06-27

## 2022-06-27 VITALS
BODY MASS INDEX: 28.12 KG/M2 | WEIGHT: 175 LBS | HEIGHT: 66 IN | HEART RATE: 102 BPM | SYSTOLIC BLOOD PRESSURE: 110 MMHG | DIASTOLIC BLOOD PRESSURE: 68 MMHG

## 2022-06-27 LAB
BILIRUB UR QL STRIP: NEGATIVE
CLARITY UR: CLEAR
COLLECTION METHOD: NORMAL
GLUCOSE UR-MCNC: NEGATIVE
HCG UR QL: 0.2 EU/DL
HCG UR QL: NEGATIVE
HGB UR QL STRIP.AUTO: NEGATIVE
KETONES UR-MCNC: NEGATIVE
LEUKOCYTE ESTERASE UR QL STRIP: NEGATIVE
NITRITE UR QL STRIP: NEGATIVE
PH UR STRIP: 5.5
PROT UR STRIP-MCNC: NEGATIVE
QUALITY CONTROL: YES
SP GR UR STRIP: 1.01

## 2022-06-27 PROCEDURE — 51784 ANAL/URINARY MUSCLE STUDY: CPT

## 2022-06-27 PROCEDURE — 51728 CYSTOMETROGRAM W/VP: CPT

## 2022-06-27 PROCEDURE — 51741 ELECTRO-UROFLOWMETRY FIRST: CPT

## 2022-06-27 PROCEDURE — 51797 INTRAABDOMINAL PRESSURE TEST: CPT

## 2022-07-14 ENCOUNTER — APPOINTMENT (OUTPATIENT)
Dept: UROGYNECOLOGY | Facility: CLINIC | Age: 37
End: 2022-07-14

## 2022-08-16 ENCOUNTER — EMERGENCY (EMERGENCY)
Facility: HOSPITAL | Age: 37
LOS: 0 days | Discharge: HOME | End: 2022-08-16
Attending: EMERGENCY MEDICINE | Admitting: EMERGENCY MEDICINE

## 2022-08-16 VITALS
RESPIRATION RATE: 18 BRPM | SYSTOLIC BLOOD PRESSURE: 115 MMHG | OXYGEN SATURATION: 97 % | TEMPERATURE: 98 F | WEIGHT: 179.9 LBS | HEART RATE: 82 BPM | DIASTOLIC BLOOD PRESSURE: 58 MMHG

## 2022-08-16 VITALS
OXYGEN SATURATION: 98 % | HEART RATE: 80 BPM | TEMPERATURE: 99 F | SYSTOLIC BLOOD PRESSURE: 108 MMHG | RESPIRATION RATE: 18 BRPM | DIASTOLIC BLOOD PRESSURE: 61 MMHG

## 2022-08-16 DIAGNOSIS — R10.30 LOWER ABDOMINAL PAIN, UNSPECIFIED: ICD-10-CM

## 2022-08-16 DIAGNOSIS — R11.0 NAUSEA: ICD-10-CM

## 2022-08-16 DIAGNOSIS — Z87.891 PERSONAL HISTORY OF NICOTINE DEPENDENCE: ICD-10-CM

## 2022-08-16 DIAGNOSIS — Z91.040 LATEX ALLERGY STATUS: ICD-10-CM

## 2022-08-16 DIAGNOSIS — R61 GENERALIZED HYPERHIDROSIS: ICD-10-CM

## 2022-08-16 DIAGNOSIS — K52.9 NONINFECTIVE GASTROENTERITIS AND COLITIS, UNSPECIFIED: ICD-10-CM

## 2022-08-16 DIAGNOSIS — Z91.048 OTHER NONMEDICINAL SUBSTANCE ALLERGY STATUS: ICD-10-CM

## 2022-08-16 LAB
ALBUMIN SERPL ELPH-MCNC: 4.9 G/DL — SIGNIFICANT CHANGE UP (ref 3.5–5.2)
ALP SERPL-CCNC: 100 U/L — SIGNIFICANT CHANGE UP (ref 30–115)
ALT FLD-CCNC: 26 U/L — SIGNIFICANT CHANGE UP (ref 0–41)
ANION GAP SERPL CALC-SCNC: 11 MMOL/L — SIGNIFICANT CHANGE UP (ref 7–14)
APPEARANCE UR: CLEAR — SIGNIFICANT CHANGE UP
AST SERPL-CCNC: 21 U/L — SIGNIFICANT CHANGE UP (ref 0–41)
BACTERIA # UR AUTO: ABNORMAL
BASOPHILS # BLD AUTO: 0.03 K/UL — SIGNIFICANT CHANGE UP (ref 0–0.2)
BASOPHILS NFR BLD AUTO: 0.3 % — SIGNIFICANT CHANGE UP (ref 0–1)
BILIRUB SERPL-MCNC: 0.3 MG/DL — SIGNIFICANT CHANGE UP (ref 0.2–1.2)
BILIRUB UR-MCNC: NEGATIVE — SIGNIFICANT CHANGE UP
BUN SERPL-MCNC: 14 MG/DL — SIGNIFICANT CHANGE UP (ref 10–20)
CALCIUM SERPL-MCNC: 9.6 MG/DL — SIGNIFICANT CHANGE UP (ref 8.5–10.1)
CHLORIDE SERPL-SCNC: 101 MMOL/L — SIGNIFICANT CHANGE UP (ref 98–110)
CO2 SERPL-SCNC: 26 MMOL/L — SIGNIFICANT CHANGE UP (ref 17–32)
COLOR SPEC: YELLOW — SIGNIFICANT CHANGE UP
CREAT SERPL-MCNC: 0.7 MG/DL — SIGNIFICANT CHANGE UP (ref 0.7–1.5)
DIFF PNL FLD: ABNORMAL
EGFR: 116 ML/MIN/1.73M2 — SIGNIFICANT CHANGE UP
EOSINOPHIL # BLD AUTO: 0.08 K/UL — SIGNIFICANT CHANGE UP (ref 0–0.7)
EOSINOPHIL NFR BLD AUTO: 0.9 % — SIGNIFICANT CHANGE UP (ref 0–8)
EPI CELLS # UR: ABNORMAL /HPF
GLUCOSE SERPL-MCNC: 104 MG/DL — HIGH (ref 70–99)
GLUCOSE UR QL: NEGATIVE MG/DL — SIGNIFICANT CHANGE UP
HCG SERPL QL: NEGATIVE — SIGNIFICANT CHANGE UP
HCT VFR BLD CALC: 38.5 % — SIGNIFICANT CHANGE UP (ref 37–47)
HGB BLD-MCNC: 12.8 G/DL — SIGNIFICANT CHANGE UP (ref 12–16)
IMM GRANULOCYTES NFR BLD AUTO: 0.3 % — SIGNIFICANT CHANGE UP (ref 0.1–0.3)
KETONES UR-MCNC: NEGATIVE — SIGNIFICANT CHANGE UP
LEUKOCYTE ESTERASE UR-ACNC: ABNORMAL
LYMPHOCYTES # BLD AUTO: 1.52 K/UL — SIGNIFICANT CHANGE UP (ref 1.2–3.4)
LYMPHOCYTES # BLD AUTO: 16.6 % — LOW (ref 20.5–51.1)
MCHC RBC-ENTMCNC: 29.4 PG — SIGNIFICANT CHANGE UP (ref 27–31)
MCHC RBC-ENTMCNC: 33.2 G/DL — SIGNIFICANT CHANGE UP (ref 32–37)
MCV RBC AUTO: 88.5 FL — SIGNIFICANT CHANGE UP (ref 81–99)
MONOCYTES # BLD AUTO: 0.63 K/UL — HIGH (ref 0.1–0.6)
MONOCYTES NFR BLD AUTO: 6.9 % — SIGNIFICANT CHANGE UP (ref 1.7–9.3)
NEUTROPHILS # BLD AUTO: 6.85 K/UL — HIGH (ref 1.4–6.5)
NEUTROPHILS NFR BLD AUTO: 75 % — SIGNIFICANT CHANGE UP (ref 42.2–75.2)
NITRITE UR-MCNC: NEGATIVE — SIGNIFICANT CHANGE UP
NRBC # BLD: 0 /100 WBCS — SIGNIFICANT CHANGE UP (ref 0–0)
PH UR: 6.5 — SIGNIFICANT CHANGE UP (ref 5–8)
PLATELET # BLD AUTO: 261 K/UL — SIGNIFICANT CHANGE UP (ref 130–400)
POTASSIUM SERPL-MCNC: 4.3 MMOL/L — SIGNIFICANT CHANGE UP (ref 3.5–5)
POTASSIUM SERPL-SCNC: 4.3 MMOL/L — SIGNIFICANT CHANGE UP (ref 3.5–5)
PROT SERPL-MCNC: 7.4 G/DL — SIGNIFICANT CHANGE UP (ref 6–8)
PROT UR-MCNC: NEGATIVE MG/DL — SIGNIFICANT CHANGE UP
RBC # BLD: 4.35 M/UL — SIGNIFICANT CHANGE UP (ref 4.2–5.4)
RBC # FLD: 13 % — SIGNIFICANT CHANGE UP (ref 11.5–14.5)
RBC CASTS # UR COMP ASSIST: ABNORMAL /HPF
SODIUM SERPL-SCNC: 138 MMOL/L — SIGNIFICANT CHANGE UP (ref 135–146)
SP GR SPEC: 1.01 — SIGNIFICANT CHANGE UP (ref 1.01–1.03)
UROBILINOGEN FLD QL: 0.2 MG/DL — SIGNIFICANT CHANGE UP
WBC # BLD: 9.14 K/UL — SIGNIFICANT CHANGE UP (ref 4.8–10.8)
WBC # FLD AUTO: 9.14 K/UL — SIGNIFICANT CHANGE UP (ref 4.8–10.8)
WBC UR QL: ABNORMAL /HPF

## 2022-08-16 PROCEDURE — 99285 EMERGENCY DEPT VISIT HI MDM: CPT

## 2022-08-16 PROCEDURE — 74176 CT ABD & PELVIS W/O CONTRAST: CPT | Mod: 26,MA

## 2022-08-16 RX ORDER — KETOROLAC TROMETHAMINE 30 MG/ML
30 SYRINGE (ML) INJECTION ONCE
Refills: 0 | Status: DISCONTINUED | OUTPATIENT
Start: 2022-08-16 | End: 2022-08-16

## 2022-08-16 RX ORDER — ONDANSETRON 8 MG/1
4 TABLET, FILM COATED ORAL ONCE
Refills: 0 | Status: COMPLETED | OUTPATIENT
Start: 2022-08-16 | End: 2022-08-16

## 2022-08-16 RX ORDER — KETOROLAC TROMETHAMINE 30 MG/ML
15 SYRINGE (ML) INJECTION ONCE
Refills: 0 | Status: DISCONTINUED | OUTPATIENT
Start: 2022-08-16 | End: 2022-08-16

## 2022-08-16 RX ADMIN — ONDANSETRON 4 MILLIGRAM(S): 8 TABLET, FILM COATED ORAL at 11:54

## 2022-08-16 RX ADMIN — Medication 15 MILLIGRAM(S): at 17:25

## 2022-08-16 RX ADMIN — Medication 15 MILLIGRAM(S): at 11:54

## 2022-08-16 NOTE — ED PROVIDER NOTE - CLINICAL SUMMARY MEDICAL DECISION MAKING FREE TEXT BOX
patient presents for evaluation of abdominal pain we obtained urine labs CT patient given IV fluids IV pain medicine IV Zofran she is improved at this time and will discharge follow-up to her PMD

## 2022-08-16 NOTE — ED PROVIDER NOTE - ATTENDING APP SHARED VISIT CONTRIBUTION OF CARE
I was present for and supervised the key and critical aspects of the procedures performed during the care of the patient. Patient is a 36-year-old female complains of abdominal pain located in the bilateral lower quadrants and suprapubic region that started suddenly this morning notes it is intermittent cramping in nature she denies any dysuria hesitancy or frequency pain was associated with diaphoresis nauseousness no vomiting patient took over-the-counter medication with limited relief though she did have a normal bowel movement this morning she denies any back pain fevers chills she denies any chest pain or shortness of breath    On physical exam patient is normocephalic atraumatic pupils equal round reactive light accommodation extraocular muscles intact oropharynx clear chest clear to auscultation bilaterally abdomen is soft she has mild tenderness to palpation in the suprapubic region no guarding no rebound no CVA tenderness extremities full range of motion with no edema    Assessment plan patient presents for evaluation of abdominal pain we obtained urine labs CT patient given IV fluids IV pain medicine IV Zofran she is improved at this time and will discharge follow-up to her PMD

## 2022-08-16 NOTE — ED PROVIDER NOTE - NS ED ATTENDING STATEMENT MOD
This was a shared visit with the PÉREZ. I reviewed and verified the documentation and independently performed the documented: Attending with

## 2022-08-16 NOTE — ED PROVIDER NOTE - PROGRESS NOTE DETAILS
Curtisgroo: Pt reports improvement in pain and nausea s/p meds but still has lower abdominal cramping sensation.

## 2022-08-16 NOTE — ED PROVIDER NOTE - NS ED ROS FT
Constitutional: No fever  Eyes:  No visual changes, eye pain, or discharge.  ENMT:  No hearing changes, earpain, sore throat, runny nose, or difficulty swallowing  Cardiac:  No chest pain, SOB or edema. No chest pain with exertion.  Respiratory:  No cough or respiratory distress.   GI:  +abd pain, nausea. No vomiting, diarrhea  :  No dysuria, frequency or burning.  MS:  No myalgia, muscle weakness, joint pain or back pain.  Neuro:  No headache or weakness.  No LOC.  Skin:  No skin rash.

## 2022-08-16 NOTE — ED PROVIDER NOTE - PATIENT PORTAL LINK FT
You can access the FollowMyHealth Patient Portal offered by [facility] by registering at the following website: [email or web address]. By joining [facility]’s FollowMyHealth portal, you will also be able to view your health information using other applications (apps) compatible with our system.

## 2022-08-16 NOTE — ED PROVIDER NOTE - OBJECTIVE STATEMENT
35 y/o F with no PMH, no hx abd surgeries presenting for lower abd pain that started mildly this morning while she was passing stool and suddenly worsened at 9am. Pain is intermittent, comes and goes about every 5 minutes, no known triggers, nonradiating. Pt used Pepto Bismol without relief. 37 y/o F with no PMH, no hx abd surgeries presenting for lower abd pain that started mildly this morning while she was passing stool and suddenly worsened at 9am. Pain is intermittent, comes and goes about every 5 minutes, no known triggers, nonradiating. A/w nausea but no vomiting. Pt used Pepto Bismol without relief. LBM this morning, small amount of watery stool. Currently menstruating since 6 days ago. No dysuria, back pain, hematuria, fever, chest pain, SOB.

## 2022-08-16 NOTE — ED PROVIDER NOTE - PHYSICAL EXAMINATION
CONSTITUTIONAL: Well-developed; well-nourished; in no acute distress.   SKIN: Warm, dry  HEAD: Normocephalic; atraumatic  EYES: PERRL, EOMI, normal sclera and conjunctiva   ENT: No nasal discharge; airway clear. MMM  NECK: Supple; non tender.  CARD:  Regular rate and rhythm. Normal S1, S2. 2+ distal pulses.   RESP: No increased WOB. CTA b/l without wheezes, crackles, rhonchi  ABD: Normoactive BS. Soft, nondistended. Mild suprapubic tenderness, no rebound tenderness. No CVA tenderness.  EXT: Normal ROM.   LYMPH: No acute cervical adenopathy.  NEURO: Alert, oriented, grossly unremarkable  PSYCH: Cooperative, appropriate.

## 2022-08-16 NOTE — ED PROVIDER NOTE - NSFOLLOWUPINSTRUCTIONS_ED_ALL_ED_FT
Colitis    WHAT YOU NEED TO KNOW:    Colitis is swelling and irritation of your colon. Colitis may be caused by ulcers or a problem with your immune system. Bacteria, a virus, or a parasite may also cause colitis. The cause may not be known. You may have diarrhea, abdominal pain, fever, or blood or mucus in your bowel movement.    DISCHARGE INSTRUCTIONS:    Return to the emergency department if:   •You have sudden trouble breathing.      •Your bowel movements are black or have blood in them.      •You have blood in your vomit.      •You have severe abdominal pain or your abdomen is swollen and feels hard.      •You have any of the following signs of dehydration: ?Dizziness or weakness      ?Dry mouth, cracked lips, or severe thirst      ?Fast heartbeat or breathing      ?Urinating very little or not at all        Call your doctor if:   •Your symptoms get worse or do not go away.      •You have a fever, chills, cough, or feel weak and achy.      •You suddenly lose weight without trying.      •You have questions or concerns about your condition or care.      Medicines:   •Medicines may be given to decrease inflammation in your colon and treat diarrhea.      •Take your medicine as directed. Contact your healthcare provider if you think your medicine is not helping or if you have side effects. Tell him or her if you are allergic to any medicine. Keep a list of the medicines, vitamins, and herbs you take. Include the amounts, and when and why you take them. Bring the list or the pill bottles to follow-up visits. Carry your medicine list with you in case of an emergency.      Manage your symptoms:   •Drink liquids as directed to help prevent dehydration. Good liquids to drink include water, juice, and broth. Ask how much liquid to drink each day. You may need to drink an oral rehydration solution (ORS). An ORS contains a balance of water, salt, and sugar to replace body fluids lost during diarrhea.      •Eat a variety of healthy foods. Healthy foods include fruits, vegetables, whole-grain breads, beans, low-fat dairy products, lean meats, and fish. You may need to eat several small meals throughout the day instead of large meals. Avoid spicy foods, caffeine, chocolate, and foods high in fat.      •Talk to your healthcare provider before you take NSAIDs. NSAIDs can cause worsen your symptoms if ulcers are causing your colitis.      •Start to exercise when you feel better. Regular exercise helps your bowels work normally. Ask about the best exercise plan for you.      Prevent the spread of germs:          •Wash your hands often. Wash your hands several times each day. Wash after you use the bathroom, change a child's diaper, and before you prepare or eat food. Use soap and water every time. Rub your soapy hands together, lacing your fingers. Wash the front and back of your hands, and in between your fingers. Use the fingers of one hand to scrub under the fingernails of the other hand. Wash for at least 20 seconds. Rinse with warm, running water for several seconds. Then dry your hands with a clean towel or paper towel. Use hand  that contains alcohol if soap and water are not available. Do not touch your eyes, nose, or mouth without washing your hands first.  Handwashing           •Cover a sneeze or cough. Use a tissue that covers your mouth and nose. Throw the tissue away in a trash can right away. Use the bend of your arm if a tissue is not available. Wash your hands well with soap and water or use a hand .      •Clean surfaces often. Clean doorknobs, countertops, cell phones, and other surfaces that are touched often. Use a disinfecting wipe, a single-use sponge, or a cloth you can wash and reuse. Use disinfecting  if you do not have wipes. You can create a disinfecting  by mixing 1 part bleach with 10 parts water.      •Ask about vaccines you may need. Vaccines help prevent disease caused by some viruses and bacteria. Get the influenza (flu) vaccine as soon as recommended each year. The flu vaccine is usually available starting in September or October. Flu viruses change, so it is important to get a flu vaccine every year. Get the pneumonia vaccine if recommended. This vaccine is usually recommended every 5 years. Your provider will tell you when to get this vaccine, if needed. Your healthcare provider can tell you if you should get other vaccines, and when to get them.      Follow up with your doctor as directed: You may need to return for a colonoscopy or other tests. Write down how often you have a bowel movements and what they look like. Bring this to your follow-up visits. Write down your questions so you remember to ask them during your visits.

## 2022-08-25 ENCOUNTER — APPOINTMENT (OUTPATIENT)
Dept: UROGYNECOLOGY | Facility: CLINIC | Age: 37
End: 2022-08-25

## 2022-08-25 ENCOUNTER — NON-APPOINTMENT (OUTPATIENT)
Age: 37
End: 2022-08-25

## 2022-08-25 VITALS
WEIGHT: 180 LBS | BODY MASS INDEX: 28.93 KG/M2 | HEIGHT: 66 IN | HEART RATE: 83 BPM | DIASTOLIC BLOOD PRESSURE: 73 MMHG | SYSTOLIC BLOOD PRESSURE: 106 MMHG

## 2022-08-25 LAB
BILIRUB UR QL STRIP: NORMAL
CLARITY UR: CLEAR
COLLECTION METHOD: NORMAL
GLUCOSE UR-MCNC: NORMAL
HCG UR QL: 0.2 EU/DL
HCG UR QL: NEGATIVE
HGB UR QL STRIP.AUTO: NORMAL
KETONES UR-MCNC: NORMAL
LEUKOCYTE ESTERASE UR QL STRIP: NORMAL
NITRITE UR QL STRIP: NORMAL
PH UR STRIP: 6
PROT UR STRIP-MCNC: NORMAL
SP GR UR STRIP: 1.01

## 2022-08-25 PROCEDURE — 81003 URINALYSIS AUTO W/O SCOPE: CPT | Mod: QW

## 2022-08-25 PROCEDURE — 81025 URINE PREGNANCY TEST: CPT

## 2022-08-25 PROCEDURE — 52000 CYSTOURETHROSCOPY: CPT

## 2022-08-25 PROCEDURE — 99215 OFFICE O/P EST HI 40 MIN: CPT | Mod: 25

## 2022-08-27 LAB — BACTERIA UR CULT: NORMAL

## 2022-12-09 ENCOUNTER — APPOINTMENT (OUTPATIENT)
Dept: UROGYNECOLOGY | Facility: CLINIC | Age: 37
End: 2022-12-09

## 2022-12-09 VITALS
WEIGHT: 175 LBS | DIASTOLIC BLOOD PRESSURE: 74 MMHG | SYSTOLIC BLOOD PRESSURE: 109 MMHG | HEIGHT: 66 IN | HEART RATE: 87 BPM | BODY MASS INDEX: 28.12 KG/M2

## 2022-12-09 DIAGNOSIS — N39.46 MIXED INCONTINENCE: ICD-10-CM

## 2022-12-09 DIAGNOSIS — K52.9 NONINFECTIVE GASTROENTERITIS AND COLITIS, UNSPECIFIED: ICD-10-CM

## 2022-12-09 PROCEDURE — 99215 OFFICE O/P EST HI 40 MIN: CPT

## 2022-12-09 RX ORDER — FLUCONAZOLE 150 MG/1
150 TABLET ORAL
Qty: 2 | Refills: 0 | Status: COMPLETED | COMMUNITY
Start: 2022-08-25 | End: 2022-12-09

## 2022-12-09 NOTE — COUNSELING
[FreeTextEntry1] : \madyson Rivero, my , will contact you to arrange your surgical date, preoperative testing and preoperative medical evaluation.\par \par Call with any issues\par \par Hopefully, I will see you in January!\par \par I will look into nickel

## 2022-12-09 NOTE — DISCUSSION/SUMMARY
[FreeTextEntry1] : \par Mixed incontinence-\par The risks and benefits of retropubic midurethral sling was reviewed. She was advised that this does not improve urge incontinence or leakage without warning and can worsen it.\par \par We discussed that sometimes after the retropubic sling is placed, restarting medications that did not work previously with urge incontinence can start working because the urethral issue has been fixed. But if they don't, the patient would then consider staged botox or interstim.\par \par She really wants to be able to run after and play with her daughter and not have incontinence while running after her.\par \par The surgical procedure of an exam under anesthesia/retropubic midurethral sling/cysto was reviewed. The patient was advised that the surgery does not improve urge incontinence and can worsen those symptoms. The postoperative restrictions were reviewed. All of the patient's questions and concerns were answered. The interpretation of the urodynamics was reviewed. \par \par The patient was counseled that the risk of long term retention requiring transection of sling is around 1%, the risk of bladder perforation with the sling placement is around 6% and the risk of the sling not helping with the stress incontinence is around 10%.\par \par The patient was advised that she is at an increased of vaginal pain postoperatively since she already has pain.\par \par The risks and benefits and alternatives of the above procedures were reviewed and informed consent was signed. The patient will be scheduled for surgery, preop lab testing and preop medical eval.\par \par \par \par \par \par

## 2022-12-09 NOTE — HISTORY OF PRESENT ILLNESS
[FreeTextEntry1] : \par Patient is here for management discussion for mixed incontinence\par Last seen on 8/25/2022 for a cysto and consent signing\par Since then has been having severe colitis symptoms. She was scheduled for a colonoscopy last week but then had the flu. It is rescheduled for next Friday. She knows that if she needs further GI treatment, that this surgery would need to be postponed further\par \par Mixed incontinence, S=U, most bothered by leakage without warning\par Trospium 20mg: no benefit\par Oxybutynin 5mg BID: no benefit\par Darefenacin 7.5 QD: no benefit\par \par She went to pelvic floor physical therapy, states that did not help her at all. Has tried Impressa, did not feel that helped her. \par \par 6/27/2022: urodynamics\par Impression: sensitive bladder (capacity 132cc), +USUI, no obstructive voiding\par Plan: consider third line treatment for urge incontinence, midurethral sling for stress incontinence, staged procedures\par \par Previous smoker\par Not done with childbearing\par

## 2024-06-08 NOTE — ED CDU PROVIDER INITIAL DAY NOTE - NEUROLOGICAL, MLM
Has thoracic aortic aneurysm, 4cm:  Followed by [facility] CT surgery outpatient  Smoking cessation counseled   Continue with asa, statin   Cont outpt follow up with primary team   Alert and oriented, no focal deficits, no motor or sensory deficits.

## 2024-09-29 ENCOUNTER — EMERGENCY (EMERGENCY)
Facility: HOSPITAL | Age: 39
LOS: 0 days | Discharge: HOME | End: 2024-09-29
Attending: EMERGENCY MEDICINE
Payer: MEDICAID

## 2024-09-29 VITALS
RESPIRATION RATE: 20 BRPM | HEART RATE: 130 BPM | OXYGEN SATURATION: 100 % | HEIGHT: 66 IN | SYSTOLIC BLOOD PRESSURE: 148 MMHG | WEIGHT: 179.9 LBS | DIASTOLIC BLOOD PRESSURE: 72 MMHG | TEMPERATURE: 98 F

## 2024-09-29 VITALS — DIASTOLIC BLOOD PRESSURE: 68 MMHG | SYSTOLIC BLOOD PRESSURE: 144 MMHG | HEART RATE: 97 BPM

## 2024-09-29 DIAGNOSIS — Z91.048 OTHER NONMEDICINAL SUBSTANCE ALLERGY STATUS: ICD-10-CM

## 2024-09-29 DIAGNOSIS — T14.8XXA OTHER INJURY OF UNSPECIFIED BODY REGION, INITIAL ENCOUNTER: ICD-10-CM

## 2024-09-29 DIAGNOSIS — Z87.891 PERSONAL HISTORY OF NICOTINE DEPENDENCE: ICD-10-CM

## 2024-09-29 DIAGNOSIS — S10.91XA ABRASION OF UNSPECIFIED PART OF NECK, INITIAL ENCOUNTER: ICD-10-CM

## 2024-09-29 DIAGNOSIS — Z01.89 ENCOUNTER FOR OTHER SPECIFIED SPECIAL EXAMINATIONS: ICD-10-CM

## 2024-09-29 DIAGNOSIS — Z91.040 LATEX ALLERGY STATUS: ICD-10-CM
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
## 2024-09-29 PROCEDURE — 99283 EMERGENCY DEPT VISIT LOW MDM: CPT

## 2024-09-29 PROCEDURE — 99284 EMERGENCY DEPT VISIT MOD MDM: CPT

## 2024-09-29 RX ORDER — HYDROXYZINE HCL 25 MG
25 TABLET ORAL ONCE
Refills: 0 | Status: COMPLETED | OUTPATIENT
Start: 2024-09-29 | End: 2024-09-29

## 2024-09-29 RX ORDER — ONDANSETRON 2 MG/ML
4 INJECTION, SOLUTION INTRAMUSCULAR; INTRAVENOUS ONCE
Refills: 0 | Status: COMPLETED | OUTPATIENT
Start: 2024-09-29 | End: 2024-09-29

## 2024-09-29 RX ADMIN — ONDANSETRON 4 MILLIGRAM(S): 2 INJECTION, SOLUTION INTRAMUSCULAR; INTRAVENOUS at 21:28

## 2024-09-29 RX ADMIN — Medication 25 MILLIGRAM(S): at 21:25
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
## 2024-09-29 NOTE — ED PROVIDER NOTE - PHYSICAL EXAMINATION
VITAL SIGNS: I have reviewed nursing notes and confirm.  CONSTITUTIONAL: Well-developed; well-nourished; in no acute distress.  SKIN: Skin exam is warm and dry, no acute rash.  HEAD: Normocephalic; atraumatic.  EYES: PERRL, EOM intact; conjunctiva and sclera clear.  ENT: No nasal deviation, no epistaxis; airway clear. Throat clear.  NECK: + superficial abrasions anterior neck.  CARD: S1, S2 normal; no murmurs, gallops, or rubs. Regular rate and rhythm.  RESP: No wheezes, rales or rhonchi.  ABD: Normal bowel sounds; soft; non-distended; non-tender; no hepatosplenomegaly.  EXT: Normal ROM. No clubbing, cyanosis or edema.  NEURO: Alert, oriented. Grossly unremarkable. No focal deficits.  PSYCH: anxious but Cooperative, appropriate.

## 2024-09-29 NOTE — ED PROVIDER NOTE - PATIENT PORTAL LINK FT
You can access the FollowMyHealth Patient Portal offered by [facility] by registering at the following website: [email or web address]. By joining [email or web address]’s FollowMyHealth portal, you will also be able to view your health information using other applications (apps) compatible with our system.
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
## 2024-09-29 NOTE — ED PROVIDER NOTE - NSFOLLOWUPINSTRUCTIONS_ED_ALL_ED_FT
Contusion    A contusion is a deep bruise. Contusions are the result of a blunt injury to tissues and muscle fibers under the skin. The injury causes bleeding under the skin. The skin overlying the contusion may turn blue, purple, or yellow. Minor injuries will give you a painless contusion, but more severe contusions may stay painful and swollen for a few weeks.     CAUSES  This condition is usually caused by a blow, trauma, or direct force to an area of the body.    SYMPTOMS  Symptoms of this condition include:    Swelling of the injured area.  Pain and tenderness in the injured area.  Discoloration. The area may have redness and then turn blue, purple, or yellow.    DIAGNOSIS  This condition is diagnosed based on a physical exam and medical history. An X-ray, CT scan, or MRI may be needed to determine if there are any associated injuries, such as broken bones (fractures).    TREATMENT  Specific treatment for this condition depends on what area of the body was injured. In general, the best treatment for a contusion is resting, icing, applying pressure to (compression), and elevating the injured area. This is often called the RICE strategy. Over-the-counter anti-inflammatory medicines may also be recommended for pain control.     HOME CARE INSTRUCTIONS  Rest the injured area.   If directed, apply ice to the injured area:  Put ice in a plastic bag.  Place a towel between your skin and the bag.  Leave the ice on for 20 minutes, 2–3 times per day.  If directed, apply light compression to the injured area using an elastic bandage. Make sure the bandage is not wrapped too tightly. Remove and reapply the bandage as directed by your health care provider.  If possible, raise (elevate) the injured area above the level of your heart while you are sitting or lying down.   Take over-the-counter and prescription medicines only as told by your health care provider.    SEEK MEDICAL CARE IF:  Your symptoms do not improve after several days of treatment.  Your symptoms get worse.   You have difficulty moving the injured area.    SEEK IMMEDIATE MEDICAL CARE IF:  You have severe pain.  You have numbness in a hand or foot.   Your hand or foot turns pale or cold.    Abrasion    An abrasion is a cut or scrape on the outer surface of your skin. An abrasion does not extend through all of the layers of your skin. It is important to care for your abrasion properly to prevent infection.     CAUSES  Most abrasions are caused by falling on or gliding across the ground or another surface. When your skin rubs on something, the outer and inner layer of skin rubs off.     SYMPTOMS  A cut or scrape is the main symptom of this condition. The scrape may be bleeding, or it may appear red or pink. If there was an associated fall, there may be an underlying bruise.    DIAGNOSIS  An abrasion is diagnosed with a physical exam.    TREATMENT  Treatment for this condition depends on how large and deep the abrasion is. Usually, your abrasion will be cleaned with water and mild soap. This removes any dirt or debris that may be stuck. An antibiotic ointment may be applied to the abrasion to help prevent infection. A bandage (dressing) may be placed on the abrasion to keep it clean.    You may also need a tetanus shot.    HOME CARE INSTRUCTIONS  Medicines    Take or apply medicines only as directed by your health care provider.  If you were prescribed an antibiotic ointment, finish all of it even if you start to feel better.    Wound Care    Clean the wound with mild soap and water 2–3 times per day or as directed by your health care provider. Pat your wound dry with a clean towel. Do not rub it.  There are many different ways to close and cover a wound. Follow instructions from your health care provider about:  Wound care.  Dressing changes and removal.  Check your wound every day for signs of infection. Watch for:  Redness, swelling, or pain.  Fluid, blood, or pus.    General Instructions    Keep the dressing dry as directed by your health care provider. Do not take baths, swim, use a hot tub, or do anything that would put your wound underwater until your health care provider approves.   If there is swelling, raise (elevate) the injured area above the level of your heart while you are sitting or lying down.  Keep all follow-up visits as directed by your health care provider. This is important.    SEEK MEDICAL CARE IF:  You received a tetanus shot and you have swelling, severe pain, redness, or bleeding at the injection site.  Your pain is not controlled with medicine.  You have increased redness, swelling, or pain at the site of your wound.    SEEK IMMEDIATE MEDICAL CARE IF:  You have a red streak going away from your wound.  You have a fever.  You have fluid, blood, or pus coming from your wound.  You notice a bad smell coming from your wound or your dressing.    ADDITIONAL NOTES AND INSTRUCTIONS    Please follow up with your Primary MD in 24-48 hr.  Seek immediate medical care for any new/worsening signs or symptoms.

## 2025-02-28 NOTE — ED ADULT TRIAGE NOTE - ACCOMPANIED BY
Blood glucose via CGM 86. No insulin given. Patient feels glucose going low. Provided with snacks.   Self

## 2025-08-09 ENCOUNTER — EMERGENCY (EMERGENCY)
Facility: HOSPITAL | Age: 40
LOS: 0 days | Discharge: HOME | End: 2025-08-09
Attending: EMERGENCY MEDICINE
Payer: MEDICAID

## 2025-08-09 VITALS
SYSTOLIC BLOOD PRESSURE: 119 MMHG | RESPIRATION RATE: 18 BRPM | WEIGHT: 293 LBS | DIASTOLIC BLOOD PRESSURE: 73 MMHG | OXYGEN SATURATION: 99 % | HEART RATE: 78 BPM | TEMPERATURE: 98 F

## 2025-08-09 DIAGNOSIS — R39.11 HESITANCY OF MICTURITION: ICD-10-CM

## 2025-08-09 DIAGNOSIS — Z87.440 PERSONAL HISTORY OF URINARY (TRACT) INFECTIONS: ICD-10-CM

## 2025-08-09 DIAGNOSIS — N32.89 OTHER SPECIFIED DISORDERS OF BLADDER: ICD-10-CM

## 2025-08-09 DIAGNOSIS — Z91.048 OTHER NONMEDICINAL SUBSTANCE ALLERGY STATUS: ICD-10-CM

## 2025-08-09 DIAGNOSIS — Z91.040 LATEX ALLERGY STATUS: ICD-10-CM

## 2025-08-09 LAB
APPEARANCE UR: CLEAR — SIGNIFICANT CHANGE UP
BILIRUB UR-MCNC: NEGATIVE — SIGNIFICANT CHANGE UP
COLOR SPEC: YELLOW — SIGNIFICANT CHANGE UP
DIFF PNL FLD: NEGATIVE — SIGNIFICANT CHANGE UP
GLUCOSE UR QL: NEGATIVE MG/DL — SIGNIFICANT CHANGE UP
KETONES UR QL: NEGATIVE MG/DL — SIGNIFICANT CHANGE UP
LEUKOCYTE ESTERASE UR-ACNC: NEGATIVE — SIGNIFICANT CHANGE UP
NITRITE UR-MCNC: NEGATIVE — SIGNIFICANT CHANGE UP
PH UR: 6.5 — SIGNIFICANT CHANGE UP (ref 5–8)
PROT UR-MCNC: NEGATIVE MG/DL — SIGNIFICANT CHANGE UP
SP GR SPEC: 1.01 — SIGNIFICANT CHANGE UP (ref 1–1.03)
UROBILINOGEN FLD QL: 0.2 MG/DL — SIGNIFICANT CHANGE UP (ref 0.2–1)

## 2025-08-09 PROCEDURE — 99284 EMERGENCY DEPT VISIT MOD MDM: CPT

## 2025-08-09 PROCEDURE — 81025 URINE PREGNANCY TEST: CPT

## 2025-08-09 PROCEDURE — 99283 EMERGENCY DEPT VISIT LOW MDM: CPT

## 2025-08-09 PROCEDURE — 81003 URINALYSIS AUTO W/O SCOPE: CPT

## 2025-08-09 RX ORDER — SULFAMETHOXAZOLE/TRIMETHOPRIM 800-160 MG
1 TABLET ORAL
Qty: 10 | Refills: 0
Start: 2025-08-09 | End: 2025-08-13

## 2025-08-09 RX ORDER — PHENAZOPYRIDINE HCL 100 MG
200 TABLET ORAL ONCE
Refills: 0 | Status: COMPLETED | OUTPATIENT
Start: 2025-08-09 | End: 2025-08-09

## 2025-08-09 RX ADMIN — Medication 200 MILLIGRAM(S): at 22:30
